# Patient Record
Sex: MALE | Race: OTHER | NOT HISPANIC OR LATINO | URBAN - METROPOLITAN AREA
[De-identification: names, ages, dates, MRNs, and addresses within clinical notes are randomized per-mention and may not be internally consistent; named-entity substitution may affect disease eponyms.]

---

## 2021-05-01 ENCOUNTER — EMERGENCY (EMERGENCY)
Facility: HOSPITAL | Age: 21
LOS: 1 days | Discharge: HOME | End: 2021-05-01
Attending: STUDENT IN AN ORGANIZED HEALTH CARE EDUCATION/TRAINING PROGRAM
Payer: COMMERCIAL

## 2021-05-01 VITALS
TEMPERATURE: 99 F | SYSTOLIC BLOOD PRESSURE: 151 MMHG | RESPIRATION RATE: 20 BRPM | DIASTOLIC BLOOD PRESSURE: 96 MMHG | HEART RATE: 92 BPM | OXYGEN SATURATION: 92 % | WEIGHT: 289.91 LBS

## 2021-05-01 VITALS — OXYGEN SATURATION: 99 %

## 2021-05-01 LAB
APPEARANCE UR: CLEAR — SIGNIFICANT CHANGE UP
BILIRUB UR-MCNC: NEGATIVE — SIGNIFICANT CHANGE UP
COLOR SPEC: YELLOW — SIGNIFICANT CHANGE UP
DIFF PNL FLD: NEGATIVE — SIGNIFICANT CHANGE UP
GLUCOSE UR QL: NEGATIVE MG/DL — SIGNIFICANT CHANGE UP
KETONES UR-MCNC: ABNORMAL
LEUKOCYTE ESTERASE UR-ACNC: NEGATIVE — SIGNIFICANT CHANGE UP
NITRITE UR-MCNC: NEGATIVE — SIGNIFICANT CHANGE UP
PH UR: 6 — SIGNIFICANT CHANGE UP (ref 5–8)
PROT UR-MCNC: NEGATIVE MG/DL — SIGNIFICANT CHANGE UP
SP GR SPEC: 1.02 — SIGNIFICANT CHANGE UP (ref 1.01–1.02)
UROBILINOGEN FLD QL: NEGATIVE MG/DL — SIGNIFICANT CHANGE UP

## 2021-05-01 PROCEDURE — 93005 ELECTROCARDIOGRAM TRACING: CPT

## 2021-05-01 PROCEDURE — 96372 THER/PROPH/DIAG INJ SC/IM: CPT

## 2021-05-01 PROCEDURE — 87591 N.GONORRHOEAE DNA AMP PROB: CPT

## 2021-05-01 PROCEDURE — 99283 EMERGENCY DEPT VISIT LOW MDM: CPT | Mod: 25

## 2021-05-01 PROCEDURE — 71046 X-RAY EXAM CHEST 2 VIEWS: CPT

## 2021-05-01 PROCEDURE — 71046 X-RAY EXAM CHEST 2 VIEWS: CPT | Mod: 26

## 2021-05-01 PROCEDURE — 93010 ELECTROCARDIOGRAM REPORT: CPT

## 2021-05-01 PROCEDURE — 87491 CHLMYD TRACH DNA AMP PROBE: CPT

## 2021-05-01 PROCEDURE — 99284 EMERGENCY DEPT VISIT MOD MDM: CPT

## 2021-05-01 PROCEDURE — 81003 URINALYSIS AUTO W/O SCOPE: CPT

## 2021-05-01 PROCEDURE — 87086 URINE CULTURE/COLONY COUNT: CPT

## 2021-05-01 RX ORDER — IBUPROFEN 200 MG
600 TABLET ORAL ONCE
Refills: 0 | Status: DISCONTINUED | OUTPATIENT
Start: 2021-05-01 | End: 2021-05-01

## 2021-05-01 RX ORDER — IBUPROFEN 200 MG
600 TABLET ORAL ONCE
Refills: 0 | Status: COMPLETED | OUTPATIENT
Start: 2021-05-01 | End: 2021-05-01

## 2021-05-01 RX ORDER — CEFTRIAXONE 500 MG/1
500 INJECTION, POWDER, FOR SOLUTION INTRAMUSCULAR; INTRAVENOUS ONCE
Refills: 0 | Status: COMPLETED | OUTPATIENT
Start: 2021-05-01 | End: 2021-05-01

## 2021-05-01 RX ADMIN — CEFTRIAXONE 500 MILLIGRAM(S): 500 INJECTION, POWDER, FOR SOLUTION INTRAMUSCULAR; INTRAVENOUS at 06:09

## 2021-05-01 RX ADMIN — Medication 100 MILLIGRAM(S): at 06:06

## 2021-05-01 RX ADMIN — Medication 600 MILLIGRAM(S): at 02:13

## 2021-05-01 NOTE — ED PROVIDER NOTE - CARE PROVIDER_API CALL
Guanakito Lenz)  Urology  [address] [place] [address]  Phone: [phone]  Fax: [phone]  Follow Up Time:

## 2021-05-01 NOTE — ED PROVIDER NOTE - ATTENDING CONTRIBUTION TO CARE
21yo male with no pmh presents with sudden onset of chest pain with shortness of breath that awoke him out of sleep. Pt notes he took a nap and woke up at 1230 with this chest pain, Notes he was breathing heavily and had some tingling in his fingers. All symptoms have since subsided. Pt admits he was anxious because he oral intercourse with a new partner on sunday. Concerned with STD. Notes has some burning after he pees. Went to urgent care 2 days ago and had blood STI testing done, pending results. Pt admits to anxiety and concern. Denies fevers, chills, nausea, vomiting penile discharge.  Const: Awake, alert and oriented. In no acute distress. Well appearing.  HEENT: NC/AT. Moist mucous membranes.  Eyes: No scleral icterus. EOMI.  Neck:. Soft and supple. Full ROM without pain.  Cardiac: Regular rate and regular rhythm. +S1/S2. Peripheral pulses 2+ and symmetric. No LE edema. chest wall reproducible ttp  Resp: Speaking in full sentences. No evidence of respiratory distress. No wheezes, rales or rhonchi.  Abd: Soft, non-tender, non-distended. Normal bowel sounds in all 4 quadrants. No guarding or rebound.  Back: Spine midline and non-tender. No CVAT.  Skin: No rashes, abrasions or lacerations.  Lymph: No cervical lymphadenopathy.  Neuro: Awake, alert & oriented x 3. Moves all extremities symmetrically.  pt counseled on safe sex practices, pt wAnted ppx for STI, GC/Chlamydia sent, cxr wnl, ekg wnl

## 2021-05-01 NOTE — ED ADULT TRIAGE NOTE - CHIEF COMPLAINT QUOTE
PT C/O of chest pain that began right before arrival.  PT woke up out of his sleep with midsternum chest pain.  Reports tingling in bilateral lower extremities and left arm.  Denies SOB, weakness or headache.  Pt reports increase stress and anxiety.  Has had poor PO intake and sleeping for the last three days. No cardiac HX.

## 2021-05-01 NOTE — ED PROVIDER NOTE - CHPI ED SYMPTOMS NEG
no back pain/no cough/no dizziness/no fever/no nausea/no syncope/no vomiting/no chills/no diaphoresis

## 2021-05-01 NOTE — ED PROVIDER NOTE - PHYSICAL EXAMINATION
- Chaperone- Sander Mccauley    Nt to palp of the testicles bilaterally, no discharge noted, no lesions,

## 2021-05-01 NOTE — ED PROVIDER NOTE - PATIENT PORTAL LINK FT
You can access the FollowMyHealth Patient Portal offered by [facility] by registering at the following website: [email or web address]. By joining [facility]’s FollowMyHealth portal, you will also be able to view your health information using other applications (apps) compatible with our system.

## 2021-05-01 NOTE — ED PROVIDER NOTE - CLINICAL SUMMARY MEDICAL DECISION MAKING FREE TEXT BOX
21 y/o male with no sign medical history presents to the ED c/o sudden onset of chest pain with shortness of breath that awoke him out of sleep. reproducible chest pain, pt anxious appearing, concerned about std, offerred testing and prophylaxis but pt wishes to wait at his time, will collect, chest xray, urianlysis and reassess

## 2021-05-01 NOTE — ED PROVIDER NOTE - PROGRESS NOTE DETAILS
POLO- Pt reassessed, pt feeling better at this time, vss, pt able to walk, talk and vocalized plan of action. Discussed in depth and explained to pt in depth the next steps that need to be taking including proper follow up with PCP or specialists. All incidental findings were discussed with pt as well. Pt verbalized their concerns and all questions were answered. Pt understands dispo and wants discharge. Given good instructions when to return to ED and importance of f/u. LUCYO- pt elects to get prophylactic treatment prior to dc

## 2021-05-01 NOTE — ED PROVIDER NOTE - OBJECTIVE STATEMENT
19 y/o male with no sign medical history presents to the ED c/o sudden onset of chest pain with shortness of breath that awoke him out of sleep. Pt notes he took a nap and woke up at 1230 with this chest pain, Notes he was breathing heavily and had some tingling in his fingers. All symptoms have since subsided. Pt admits he was anxious because he oral intercourse with a partner on sunday. Concerned with STD. Notes has some burning after he pees. Went to urgent care 2 days ago and had testing for GC, pending results. Pt admits to anxiety and concern. Denies fevers, chills, nausea, vomiting penile discharge.

## 2021-05-02 LAB
CULTURE RESULTS: SIGNIFICANT CHANGE UP
SPECIMEN SOURCE: SIGNIFICANT CHANGE UP

## 2021-05-03 LAB
C TRACH RRNA SPEC QL NAA+PROBE: SIGNIFICANT CHANGE UP
N GONORRHOEA RRNA SPEC QL NAA+PROBE: SIGNIFICANT CHANGE UP

## 2021-08-04 PROBLEM — Z78.9 OTHER SPECIFIED HEALTH STATUS: Chronic | Status: ACTIVE | Noted: 2021-05-01

## 2021-08-09 PROBLEM — Z00.00 ENCOUNTER FOR PREVENTIVE HEALTH EXAMINATION: Status: ACTIVE | Noted: 2021-08-09

## 2021-08-11 ENCOUNTER — OUTPATIENT (OUTPATIENT)
Dept: OUTPATIENT SERVICES | Facility: HOSPITAL | Age: 21
LOS: 1 days | End: 2021-08-11
Payer: MEDICAID

## 2021-08-11 ENCOUNTER — APPOINTMENT (OUTPATIENT)
Age: 21
End: 2021-08-11
Payer: MEDICAID

## 2021-08-11 DIAGNOSIS — Z00.8 ENCOUNTER FOR OTHER GENERAL EXAMINATION: ICD-10-CM

## 2021-08-11 PROCEDURE — 74177 CT ABD & PELVIS W/CONTRAST: CPT

## 2021-08-11 PROCEDURE — 74177 CT ABD & PELVIS W/CONTRAST: CPT | Mod: 26
